# Patient Record
Sex: MALE | Race: ASIAN | Employment: FULL TIME | URBAN - METROPOLITAN AREA
[De-identification: names, ages, dates, MRNs, and addresses within clinical notes are randomized per-mention and may not be internally consistent; named-entity substitution may affect disease eponyms.]

---

## 2020-06-23 ENCOUNTER — HOSPITAL ENCOUNTER (OUTPATIENT)
Dept: GENERAL RADIOLOGY | Age: 35
Discharge: HOME | End: 2020-06-23
Payer: OTHER GOVERNMENT

## 2020-06-23 DIAGNOSIS — M54.12 C6 RADICULOPATHY: ICD-10-CM

## 2020-06-23 PROCEDURE — 72050 X-RAY EXAM NECK SPINE 4/5VWS: CPT

## 2020-08-03 ENCOUNTER — OFFICE VISIT (OUTPATIENT)
Dept: ORTHOPEDIC SURGERY | Age: 35
End: 2020-08-03

## 2020-08-03 VITALS
OXYGEN SATURATION: 97 % | TEMPERATURE: 98.1 F | BODY MASS INDEX: 25.55 KG/M2 | DIASTOLIC BLOOD PRESSURE: 96 MMHG | HEIGHT: 68 IN | HEART RATE: 90 BPM | RESPIRATION RATE: 14 BRPM | SYSTOLIC BLOOD PRESSURE: 169 MMHG | WEIGHT: 168.6 LBS

## 2020-08-03 DIAGNOSIS — M54.2 NECK PAIN: ICD-10-CM

## 2020-08-03 DIAGNOSIS — M54.12 CERVICAL RADICULOPATHY: Primary | ICD-10-CM

## 2020-08-03 DIAGNOSIS — M79.18 CERVICAL MYOFASCIAL PAIN SYNDROME: ICD-10-CM

## 2020-08-03 RX ORDER — GABAPENTIN 300 MG/1
CAPSULE ORAL
COMMUNITY
Start: 2020-07-20

## 2020-08-03 RX ORDER — IBUPROFEN 800 MG/1
TABLET ORAL
COMMUNITY
Start: 2020-06-20

## 2020-08-03 NOTE — PROGRESS NOTES
[address], [address]  [place]  Phone: [phone]  Fax: [phone]        Marlon Peer  : 1985  PCP: Hina Romero NP  2020    NEW PATIENT      HISTORY OF PRESENT ILLNESS  Anand Collins is a 28 y.o. male c/o neck pain radiating into the LUE into the thumb, index, and middle fingers. His pain began when he was out of work sick in March, and he felt like he slept wrong. They were then exacerbated after he was working on his car. His symptoms have improved some. His pain is worse with cervical extension or flexion. He works as a  at [employer]. Pt notes that he is LHD with writing, but he is generally stronger on the right. He was scheduled to have an MRI but canceled his appointment because he has improved on its own and due to the high copay cost. He has been wearing a brace to help his posture. He maintains an HEP of dancing and push ups. Pain Score: 2/10. Treatments patient has tried:  Physical therapy:Unknown  Doing HEP: Yes  Non-opioid medications: Yes  Spinal injections: No  Spinal surgery- No.   Last Cervical MRI: None. PmHx: None. ASSESSMENT  This is a 28year-old male with history of neck pain radiating into the LUE. His symptoms are likely due to a left C5/6 radiculopathy. He had a positive Spurling's sign on the L. He maintains strength and sensation. PLAN  1. Maintain HEP. 2. He can call if he would like a Prednisone dose pack in case of flare up. 3. I will likely refer for an updated MRI after he changes insurance companies. He can call if he would like to proceed with MRI or PT.  4. He can call if he would like a light duty work note. Pt will f/u in 2 months or sooner if needed. Diagnoses and all orders for this visit:    1. Cervical radiculopathy    2. Neck pain    3.  Cervical myofascial pain syndrome       Follow-up and Dispositions    · Return in about 2 months (around 10/3/2020). Winnie Salomon is seen today in consultation at the request of Tommy Barnett NP for complaints of neck pain radiating into LUE. PAST MEDICAL HISTORY   History reviewed. No pertinent past medical history. History reviewed. No pertinent surgical history. MEDICATIONS        ALLERGIES  No Known Allergies       SOCIAL HISTORY    Social History     Socioeconomic History    Marital status: SINGLE     Spouse name: Not on file    Number of children: Not on file    Years of education: Not on file    Highest education level: Not on file   Tobacco Use    Smoking status: Light Tobacco Smoker    Smokeless tobacco: Current User   Substance and Sexual Activity    Alcohol use: Yes    Drug use: Never       FAMILY HISTORY    Family History   Family history unknown: Yes         REVIEW OF SYSTEMS  Review of Systems   Musculoskeletal: Positive for neck pain. LUE paraesthesia         PHYSICAL EXAMINATION  Visit Vitals  BP (!) 169/96 (BP 1 Location: Right arm, BP Patient Position: Sitting)   Pulse 90   Temp 98.1 °F (36.7 °C) (Oral)   Resp 14   Ht 5' 8\" (1.727 m)   Wt 168 lb 9.6 oz (76.5 kg)   SpO2 97%   BMI 25.64 kg/m²         Pain Assessment  8/3/2020   Location of Pain Arm;Neck   Location Modifiers Left   Severity of Pain 2   Quality of Pain (No Data)   Quality of Pain Comment numbness and tingling   Duration of Pain Persistent   Frequency of Pain Constant   Aggravating Factors Other (Comment)   Aggravating Factors Comment looking up or down   Limiting Behavior Some   Relieving Factors Exercises; Elevation   Result of Injury No         Constitutional:  Well developed, well nourished, in no acute distress. Psychiatric: Affect and mood are appropriate. HEENT: Normocephalic, atraumatic. Extraocular movements intact. Integumentary: No rashes or abrasions noted on exposed areas. Cardiovascular: Regular rate and rhythm.    Pulmonary: Clear to auscultation bilaterally. SPINE/MUSCULOSKELETAL EXAM    Cervical spine:  Neck is midline. Normal muscle tone. No focal atrophy is noted. ROM pain free. Shoulder ROM intact. No tenderness to palpation. Positive Spurling's sign on the L. Negative Tinel's sign. Negative Keita's sign. Sensation in the bilateral arms grossly intact to light touch. MOTOR:      Biceps  Triceps Deltoids Wrist Ext Wrist Flex Hand Intrin   Right 5/5 5/5 5/5 5/5 5/5 5/5   Left 5/5 5/5 5/5 5/5 5/5 5/5             Hip Flex  Quads Hamstrings Ankle DF EHL Ankle PF   Right 5/5 5/5 5/5 5/5 5/5 5/5   Left 5/5 5/5 5/5 5/5 5/5 5/5     DTRs are 2+ biceps, triceps, brachioradialis, patella, and Achilles. Negative Straight Leg raise. Squat not tested. No difficulty with tandem gait. Ambulation without assistive device. FWB. RADIOGRAPHS/DATA  Cervical XR images taken on 6/23/2020 personally reviewed with patient:  7 cervical spine vertebra serve visualized minimal reversal of the  cervical spine lordosis. No vertebral body height loss or intravertebral space  narrowing is seen to suggest compression deformity. No acute fracture or  spondylolisthesis is noted. No prevertebral soft tissue swelling is seen.     IMPRESSION  IMPRESSION:  1. No acute fracture or spondylolisthesis. Minimal reversal of the cervical  spinal lordosis.  reviewed    Mr. Maylin Cooley has a reminder for a \"due or due soon\" health maintenance. I have asked that he contact his primary care provider for follow-up on this health maintenance. 35 minutes of face-to-face contact were spent with the patient during today's visit extensively discussing symptoms and treatment plan. All questions were answered. More than half of this visit today was spent on counseling. Written by Rell Culver, as dictated by Dr. Rosalina Escobar. I, Dr. Rosalina Escobar, confirm that all documentation is accurate.

## 2020-10-01 ENCOUNTER — OFFICE VISIT (OUTPATIENT)
Dept: ORTHOPEDIC SURGERY | Age: 35
End: 2020-10-01
Payer: OTHER GOVERNMENT

## 2020-10-01 VITALS
DIASTOLIC BLOOD PRESSURE: 69 MMHG | WEIGHT: 181 LBS | BODY MASS INDEX: 27.43 KG/M2 | OXYGEN SATURATION: 98 % | HEIGHT: 68 IN | RESPIRATION RATE: 24 BRPM | SYSTOLIC BLOOD PRESSURE: 133 MMHG | TEMPERATURE: 97.6 F | HEART RATE: 75 BPM

## 2020-10-01 DIAGNOSIS — G89.29 CHRONIC PRIMARY MUSCULOSKELETAL PAIN: ICD-10-CM

## 2020-10-01 DIAGNOSIS — M79.18 CHRONIC PRIMARY MUSCULOSKELETAL PAIN: ICD-10-CM

## 2020-10-01 DIAGNOSIS — M54.12 CERVICAL RADICULOPATHY: Primary | ICD-10-CM

## 2020-10-01 PROCEDURE — 99213 OFFICE O/P EST LOW 20 MIN: CPT | Performed by: PHYSICAL MEDICINE & REHABILITATION

## 2020-10-01 NOTE — PROGRESS NOTES
[address]  [address], [address]  [place]  Phone: [phone]  Fax: [phone]        Ashley Tay  : 1985  PCP: Debbie Holden NP  10/1/2020    PROGRESS NOTE      HISTORY OF PRESENT ILLNESS  Gloria Amos is a 28 y.o. male who was seen as a new patient 8/3/2020 with c/o neck pain radiating into the LUE into the thumb, index, and middle fingers. His pain began when he was out of work sick in March, and he felt like he slept wrong. They were then exacerbated after he was working on his car. His symptoms have improved some. His pain is worse with cervical extension or flexion. He works as a  at [employer]. Pt notes that he is LHD with writing, but he is generally stronger on the right. He was scheduled to have an MRI but canceled his appointment because he has improved on its own and due to the high copay cost. He has been wearing a brace to help his posture. He maintains an HEP of dancing and push ups. Gloria Amos comes in to the office today for f/u. Pt notes that he has not had any pain and numbness in the LUE. He has been maintaining an HEP and seeing a chiropractor Geetha Gonzales with significant benefit. The chiropractor has been treating his neck, shoulder, and upper back with significant improvement. Pain Score: 0/10. Treatments patient has tried:  Physical therapy/Chiropractic care - yes with benefit   Doing HEP: Yes  Non-opioid medications: Yes  Spinal injections: No  Spinal surgery- No.   Last Cervical MRI: None.     PmHx: None. ASSESSMENT  This is a 28year-old male with history of neck pain radiating into the LUE. His symptoms were likely due to a left C5/6 radiculopathy vs a primary musculoskeletal pain. He previously had a positive Spurling's sign on the L. He maintains strength and sensation. PLAN  1. Maintain HEP.   2. He can call if he would like to proceed with an MRI or EMG if his symptoms return. Pt will f/u PRN. Diagnoses and all orders for this visit:    1. Cervical radiculopathy    2. Chronic primary musculoskeletal pain       PAST MEDICAL HISTORY   No past medical history on file. No past surgical history on file. Chris Thomas MEDICATIONS      Current Outpatient Medications   Medication Sig Dispense Refill    gabapentin (NEURONTIN) 300 mg capsule       ibuprofen (MOTRIN) 800 mg tablet TAKE 1 TABLET BY MOUTH EVERY 8 HOURS AS NEEDED FOR PAIN WITH FOOD          ALLERGIES  No Known Allergies       SOCIAL HISTORY    Social History     Socioeconomic History    Marital status: SINGLE     Spouse name: Not on file    Number of children: Not on file    Years of education: Not on file    Highest education level: Not on file   Tobacco Use    Smoking status: Light Tobacco Smoker    Smokeless tobacco: Current User   Substance and Sexual Activity    Alcohol use: Yes    Drug use: Never       FAMILY HISTORY  Family History   Family history unknown: Yes         REVIEW OF SYSTEMS  Review of Systems   Musculoskeletal: Positive for neck pain. LUE paraesthesia (improved)          PHYSICAL EXAMINATION  Visit Vitals  /69 (BP 1 Location: Right arm, BP Patient Position: Sitting)   Pulse 75   Temp 97.6 °F (36.4 °C) (Skin)   Resp 24   Ht 5' 8\" (1.727 m)   Wt 181 lb (82.1 kg)   SpO2 98% Comment: RA   BMI 27.52 kg/m²       Pain Assessment  10/1/2020   Location of Pain Neck   Location Modifiers -   Severity of Pain 0   Quality of Pain -   Quality of Pain Comment -   Duration of Pain -   Frequency of Pain -   Aggravating Factors -   Aggravating Factors Comment -   Limiting Behavior -   Relieving Factors -   Result of Injury -           Constitutional:  Well developed, well nourished, in no acute distress. Psychiatric: Affect and mood are appropriate. Integumentary: No rashes or abrasions noted on exposed areas. SPINE/MUSCULOSKELETAL EXAM    Cervical spine:  Neck is midline. Normal muscle tone. No focal atrophy is noted. ROM pain free. Shoulder ROM intact. No tenderness to palpation. Positive Spurling's sign on the L. Negative Tinel's sign. Negative Keita's sign. Sensation in the bilateral arms grossly intact to light touch. MOTOR:      Biceps  Triceps Deltoids Wrist Ext Wrist Flex Hand Intrin   Right 5/5 5/5 5/5 5/5 5/5 5/5   Left 5/5 5/5 5/5 5/5 5/5 5/5             Hip Flex  Quads Hamstrings Ankle DF EHL Ankle PF   Right 5/5 5/5 5/5 5/5 5/5 5/5   Left 5/5 5/5 5/5 5/5 5/5 5/5     DTRs are 2+ biceps, triceps, brachioradialis, patella, and Achilles.     Negative Straight Leg raise. Squat not tested. No difficulty with tandem gait.      Ambulation without assistive device. FWB.       RADIOGRAPHS/DATA  Cervical XR images taken on 6/23/2020 personally reviewed with patient:  7 cervical spine vertebra serve visualized minimal reversal of the  cervical spine lordosis. No vertebral body height loss or intravertebral space  narrowing is seen to suggest compression deformity. No acute fracture or  spondylolisthesis is noted. No prevertebral soft tissue swelling is seen.     IMPRESSION  IMPRESSION:  1. No acute fracture or spondylolisthesis. Minimal reversal of the cervical  spinal lordosis. 10 minutes of face-to-face contact were spent with the patient during today's visit extensively discussing symptoms and treatment plan. All questions were answered. More than half of this visit today was spent on counseling.      Written by Dusty Severance as dictated by Mervat Quintanilla MD

## 2020-10-01 NOTE — PROGRESS NOTES
Kim Cancino presents today for   Chief Complaint   Patient presents with    Neck Pain       Is someone accompanying this pt? no    Is the patient using any DME equipment during OV? no      Coordination of Care:  1. Have you been to the ER, urgent care clinic since your last visit? no  Hospitalized since your last visit? no    2. Have you seen or consulted any other health care providers outside of the [address] [place] [address] since your last visit? no Include any pap smears or colon screening.  no

## 2023-01-29 ENCOUNTER — APPOINTMENT (OUTPATIENT)
Dept: GENERAL RADIOLOGY | Age: 38
End: 2023-01-29
Attending: EMERGENCY MEDICINE
Payer: COMMERCIAL

## 2023-01-29 ENCOUNTER — HOSPITAL ENCOUNTER (EMERGENCY)
Age: 38
Discharge: HOME | End: 2023-01-29
Attending: EMERGENCY MEDICINE
Payer: COMMERCIAL

## 2023-01-29 VITALS
TEMPERATURE: 97.5 F | BODY MASS INDEX: 24.44 KG/M2 | DIASTOLIC BLOOD PRESSURE: 80 MMHG | RESPIRATION RATE: 16 BRPM | HEART RATE: 65 BPM | HEIGHT: 69 IN | WEIGHT: 165 LBS | OXYGEN SATURATION: 100 % | SYSTOLIC BLOOD PRESSURE: 121 MMHG

## 2023-01-29 DIAGNOSIS — S61.219A FINGER LACERATION INVOLVING TENDON, INITIAL ENCOUNTER: Primary | ICD-10-CM

## 2023-01-29 PROCEDURE — 99284 EMERGENCY DEPT VISIT MOD MDM: CPT

## 2023-01-29 PROCEDURE — 73140 X-RAY EXAM OF FINGER(S): CPT

## 2023-01-29 PROCEDURE — 74011000250 HC RX REV CODE- 250: Performed by: EMERGENCY MEDICINE

## 2023-01-29 PROCEDURE — 74011250637 HC RX REV CODE- 250/637: Performed by: EMERGENCY MEDICINE

## 2023-01-29 PROCEDURE — 75810000293 HC SIMP/SUPERF WND  RPR

## 2023-01-29 RX ORDER — LIDOCAINE HYDROCHLORIDE 10 MG/ML
5 INJECTION, SOLUTION EPIDURAL; INFILTRATION; INTRACAUDAL; PERINEURAL ONCE
Status: COMPLETED | OUTPATIENT
Start: 2023-01-29 | End: 2023-01-29

## 2023-01-29 RX ORDER — CEPHALEXIN 250 MG/1
500 CAPSULE ORAL
Status: COMPLETED | OUTPATIENT
Start: 2023-01-29 | End: 2023-01-29

## 2023-01-29 RX ORDER — CEPHALEXIN 500 MG/1
500 CAPSULE ORAL 4 TIMES DAILY
Qty: 28 CAPSULE | Refills: 0 | Status: SHIPPED | OUTPATIENT
Start: 2023-01-29 | End: 2023-02-05

## 2023-01-29 RX ADMIN — CEPHALEXIN 500 MG: 250 CAPSULE ORAL at 19:59

## 2023-01-29 RX ADMIN — LIDOCAINE HYDROCHLORIDE 5 ML: 10 INJECTION, SOLUTION EPIDURAL; INFILTRATION; INTRACAUDAL; PERINEURAL at 16:32

## 2023-01-29 NOTE — ED NOTES
Patient has a wound on his left index finger. Upon inspection of patient wound, wound has a long laceration that's approx 3 inches long by 1 1/2 inches wide. Wound cleaned with wound  and wound irrigated with normal saline. Patient tolerated procedure well and provider made aware of patients laceration.

## 2023-01-29 NOTE — Clinical Note
[address] [facility] EMERGENCY DEPT  [address] [id number] [phone]    Work/School Note    Date: 1/29/2023    To Whom It May concern:    Della Taveras was seen and treated today in the emergency room by the following provider(s):  Attending Provider: Padmaja Harden MD.      Della Taveras is excused from work/school on 1/29/2023 through 1/31/2023. He is medically clear to return to work/school on 2/1/2023.          Sincerely,          Nohemi Polanco MD

## 2023-01-29 NOTE — Clinical Note
[address] [facility] EMERGENCY DEPT  [address] [id number] [phone]    Work/School Note    Date: 1/29/2023    To Whom It May concern:    Angela Drummond was seen and treated today in the emergency room by the following provider(s):  Attending Provider: Mary Shea MD.      Angela Drummond is excused from work/school on 01/29/23 and 01/30/23. He is medically clear to return to work/school on 1/31/2023.        Sincerely,          Mary Mullen MD
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
## 2023-01-29 NOTE — Clinical Note
[address] [facility] EMERGENCY DEPT  [address] [address] [phone]    Work/School Note    Date: 1/29/2023    To Whom It May concern:    Matty Li was seen and treated today in the emergency room by the following provider(s):  Attending Provider: Rea Barros MD.      Matty Li is excused from work/school on 01/29/23 and 01/30/23. He is medically clear to return to work/school on 1/31/2023.        Sincerely,          Mary Carlos MD

## 2023-01-30 NOTE — ED NOTES
Verbal shift change report given to Fabienne, [facility] (oncoming nurse) by Soraya Dumas RN (offgoing nurse). Report included the following information SBAR, ED Summary, and MAR.
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
## 2023-01-30 NOTE — ED NOTES
Patient refused tdap at this time . I have reviewed discharge instructions with the patient. The patient verbalized understanding. Current Discharge Medication List      START taking these medications    Details   cephALEXin (Keflex) 500 mg capsule Take 1 Capsule by mouth four (4) times daily for 7 days.   Qty: 28 Capsule, Refills: 0  Start date: 1/29/2023, End date: 2/5/2023

## 2023-01-30 NOTE — DISCHARGE INSTRUCTIONS
1.  Wound reevaluation in 24 to 48 hours by healthcare provider. 2.  Take Tylenol and/or ibuprofen as needed as directed on container. 3.  He can appointment to follow-up with Dr. Ember Huerta as soon as possible. 4.  Return immediately to the emergency department for any worsening or concerning symptoms.

## 2025-04-11 ENCOUNTER — HOSPITAL ENCOUNTER (OUTPATIENT)
Facility: HOSPITAL | Age: 40
Discharge: HOME | End: 2025-04-14
Payer: COMMERCIAL

## 2025-04-11 LAB
ALBUMIN SERPL-MCNC: 4.2 G/DL (ref 3.4–5)
ALBUMIN/GLOB SERPL: 1.3 (ref 0.8–1.7)
ALP SERPL-CCNC: 59 U/L (ref 45–117)
ALT SERPL-CCNC: 18 U/L (ref 16–61)
ANION GAP SERPL CALC-SCNC: 5 MMOL/L (ref 3–18)
AST SERPL-CCNC: 10 U/L (ref 10–38)
BASOPHILS # BLD: 0.16 K/UL (ref 0–0.1)
BASOPHILS NFR BLD: 3 % (ref 0–2)
BILIRUB SERPL-MCNC: 0.9 MG/DL (ref 0.2–1)
BUN SERPL-MCNC: 14 MG/DL (ref 7–18)
BUN/CREAT SERPL: 13 (ref 12–20)
CALCIUM SERPL-MCNC: 9.5 MG/DL (ref 8.5–10.1)
CHLORIDE SERPL-SCNC: 105 MMOL/L (ref 100–111)
CHOLEST SERPL-MCNC: 191 MG/DL
CO2 SERPL-SCNC: 31 MMOL/L (ref 21–32)
CREAT SERPL-MCNC: 1.09 MG/DL (ref 0.6–1.3)
DIFFERENTIAL METHOD BLD: ABNORMAL
EOSINOPHIL # BLD: 0.25 K/UL (ref 0–0.4)
EOSINOPHIL NFR BLD: 4.8 % (ref 0–5)
ERYTHROCYTE [DISTWIDTH] IN BLOOD BY AUTOMATED COUNT: 12.1 % (ref 11.6–14.5)
GLOBULIN SER CALC-MCNC: 3.2 G/DL (ref 2–4)
GLUCOSE SERPL-MCNC: 97 MG/DL (ref 74–99)
HCT VFR BLD AUTO: 44.9 % (ref 36–48)
HDLC SERPL-MCNC: 58 MG/DL (ref 40–60)
HDLC SERPL: 3.3 (ref 0–5)
HGB BLD-MCNC: 14.8 G/DL (ref 13–16)
IMM GRANULOCYTES # BLD AUTO: 0.01 K/UL (ref 0–0.04)
IMM GRANULOCYTES NFR BLD AUTO: 0.2 % (ref 0–0.5)
LDLC SERPL CALC-MCNC: 113.2 MG/DL (ref 0–100)
LIPID PANEL: ABNORMAL
LYMPHOCYTES # BLD: 2.11 K/UL (ref 0.9–3.6)
LYMPHOCYTES NFR BLD: 40.1 % (ref 21–52)
MCH RBC QN AUTO: 30 PG (ref 24–34)
MCHC RBC AUTO-ENTMCNC: 33 G/DL (ref 31–37)
MCV RBC AUTO: 91.1 FL (ref 78–100)
MONOCYTES # BLD: 0.44 K/UL (ref 0.05–1.2)
MONOCYTES NFR BLD: 8.4 % (ref 3–10)
NEUTS SEG # BLD: 2.29 K/UL (ref 1.8–8)
NEUTS SEG NFR BLD: 43.5 % (ref 40–73)
NRBC # BLD: 0 K/UL (ref 0–0.01)
NRBC BLD-RTO: 0 PER 100 WBC
PLATELET # BLD AUTO: 305 K/UL (ref 135–420)
PMV BLD AUTO: 9.5 FL (ref 9.2–11.8)
POTASSIUM SERPL-SCNC: 3.9 MMOL/L (ref 3.5–5.5)
PROT SERPL-MCNC: 7.4 G/DL (ref 6.4–8.2)
RBC # BLD AUTO: 4.93 M/UL (ref 4.35–5.65)
SODIUM SERPL-SCNC: 141 MMOL/L (ref 136–145)
T4 FREE SERPL-MCNC: 0.9 NG/DL (ref 0.7–1.5)
TRIGL SERPL-MCNC: 99 MG/DL
TSH SERPL DL<=0.05 MIU/L-ACNC: 1.03 UIU/ML (ref 0.36–3.74)
VLDLC SERPL CALC-MCNC: 19.8 MG/DL
WBC # BLD AUTO: 5.3 K/UL (ref 4.6–13.2)

## 2025-04-11 PROCEDURE — 84443 ASSAY THYROID STIM HORMONE: CPT

## 2025-04-11 PROCEDURE — 80061 LIPID PANEL: CPT

## 2025-04-11 PROCEDURE — 36415 COLL VENOUS BLD VENIPUNCTURE: CPT

## 2025-04-11 PROCEDURE — 80053 COMPREHEN METABOLIC PANEL: CPT

## 2025-04-11 PROCEDURE — 84439 ASSAY OF FREE THYROXINE: CPT

## 2025-04-11 PROCEDURE — 85025 COMPLETE CBC W/AUTO DIFF WBC: CPT

## 2025-04-11 PROCEDURE — 82306 VITAMIN D 25 HYDROXY: CPT

## 2025-04-12 LAB — 25(OH)D3 SERPL-MCNC: 37.1 NG/ML (ref 30–100)
